# Patient Record
Sex: FEMALE | Race: WHITE | NOT HISPANIC OR LATINO
[De-identification: names, ages, dates, MRNs, and addresses within clinical notes are randomized per-mention and may not be internally consistent; named-entity substitution may affect disease eponyms.]

---

## 2021-12-01 ENCOUNTER — NON-APPOINTMENT (OUTPATIENT)
Age: 51
End: 2021-12-01

## 2021-12-21 PROBLEM — Z00.00 ENCOUNTER FOR PREVENTIVE HEALTH EXAMINATION: Status: ACTIVE | Noted: 2021-12-21

## 2021-12-22 ENCOUNTER — APPOINTMENT (OUTPATIENT)
Dept: OTOLARYNGOLOGY | Facility: CLINIC | Age: 51
End: 2021-12-22
Payer: SELF-PAY

## 2021-12-22 DIAGNOSIS — H93.12 TINNITUS, LEFT EAR: ICD-10-CM

## 2021-12-22 DIAGNOSIS — Z80.3 FAMILY HISTORY OF MALIGNANT NEOPLASM OF BREAST: ICD-10-CM

## 2021-12-22 DIAGNOSIS — Z78.0 ASYMPTOMATIC MENOPAUSAL STATE: ICD-10-CM

## 2021-12-22 DIAGNOSIS — K21.9 GASTRO-ESOPHAGEAL REFLUX DISEASE W/OUT ESOPHAGITIS: ICD-10-CM

## 2021-12-22 DIAGNOSIS — J31.0 CHRONIC RHINITIS: ICD-10-CM

## 2021-12-22 PROCEDURE — 92567 TYMPANOMETRY: CPT

## 2021-12-22 PROCEDURE — 92557 COMPREHENSIVE HEARING TEST: CPT

## 2021-12-22 PROCEDURE — 99203 OFFICE O/P NEW LOW 30 MIN: CPT | Mod: 25

## 2021-12-22 PROCEDURE — 31231 NASAL ENDOSCOPY DX: CPT

## 2021-12-22 NOTE — ASSESSMENT
[FreeTextEntry1] : SEVERINE FERRARI has normal hearing on audiogra.. She has left TMJ dysfunction and cervical spasm. I suggested a soft diet, chewing evenly and avoiding nuts and gum.\par I also suggested heat and gentle massage to trapezius muscle.\par I also suggested an OTC (Plackers) grinding guard.\par \par \par I think LPR may be cause of her sinus problem and I suggest 24 hour pH test.

## 2021-12-22 NOTE — HISTORY OF PRESENT ILLNESS
[de-identified] : SEVERINE FERRARI is a 51 year woman with a history of allergies and CRS. She has constant nasal pressure. She gets PND and throat clearing. She has ringing and pressure in her left ear. She may have eye problem with elevated pressure.

## 2021-12-22 NOTE — REASON FOR VISIT
[Initial Consultation] : an initial consultation for [FreeTextEntry2] : sinus pressure and tinnitus

## 2022-01-05 ENCOUNTER — TRANSCRIPTION ENCOUNTER (OUTPATIENT)
Age: 52
End: 2022-01-05